# Patient Record
Sex: FEMALE | Race: WHITE | NOT HISPANIC OR LATINO | URBAN - METROPOLITAN AREA
[De-identification: names, ages, dates, MRNs, and addresses within clinical notes are randomized per-mention and may not be internally consistent; named-entity substitution may affect disease eponyms.]

---

## 2018-06-15 ENCOUNTER — LAB REQUISITION (OUTPATIENT)
Dept: LAB | Facility: HOSPITAL | Age: 63
End: 2018-06-15
Payer: COMMERCIAL

## 2018-06-15 DIAGNOSIS — Z12.72 ENCOUNTER FOR SCREENING FOR MALIGNANT NEOPLASM OF VAGINA: ICD-10-CM

## 2018-06-15 DIAGNOSIS — Z01.419 ENCOUNTER FOR GYNECOLOGICAL EXAMINATION WITHOUT ABNORMAL FINDING: ICD-10-CM

## 2018-06-15 DIAGNOSIS — Z11.51 ENCOUNTER FOR SCREENING FOR HUMAN PAPILLOMAVIRUS (HPV): ICD-10-CM

## 2018-06-15 PROCEDURE — 87624 HPV HI-RISK TYP POOLED RSLT: CPT | Performed by: OBSTETRICS & GYNECOLOGY

## 2018-06-15 PROCEDURE — G0145 SCR C/V CYTO,THINLAYER,RESCR: HCPCS | Performed by: OBSTETRICS & GYNECOLOGY

## 2018-06-19 LAB
HPV RRNA GENITAL QL NAA+PROBE: NORMAL
LAB AP GYN PRIMARY INTERPRETATION: NORMAL
Lab: NORMAL

## 2019-08-09 ENCOUNTER — TELEPHONE (OUTPATIENT)
Dept: GYNECOLOGY | Facility: CLINIC | Age: 64
End: 2019-08-09

## 2019-08-09 DIAGNOSIS — N95.2 POSTMENOPAUSAL ATROPHIC VAGINITIS: Primary | ICD-10-CM

## 2019-08-12 RX ORDER — ESTRADIOL 10 UG/1
1 INSERT VAGINAL 2 TIMES WEEKLY
Qty: 24 TABLET | Refills: 4 | Status: SHIPPED | OUTPATIENT
Start: 2019-08-12 | End: 2019-08-16 | Stop reason: SDUPTHER

## 2019-08-16 ENCOUNTER — ANNUAL EXAM (OUTPATIENT)
Dept: GYNECOLOGY | Facility: CLINIC | Age: 64
End: 2019-08-16
Payer: COMMERCIAL

## 2019-08-16 VITALS
HEIGHT: 65 IN | HEART RATE: 61 BPM | TEMPERATURE: 97.5 F | WEIGHT: 202.6 LBS | DIASTOLIC BLOOD PRESSURE: 74 MMHG | BODY MASS INDEX: 33.76 KG/M2 | SYSTOLIC BLOOD PRESSURE: 120 MMHG | RESPIRATION RATE: 17 BRPM

## 2019-08-16 DIAGNOSIS — N39.3 SUI (STRESS URINARY INCONTINENCE, FEMALE): ICD-10-CM

## 2019-08-16 DIAGNOSIS — Z85.42 PERSONAL HISTORY OF MALIGNANT NEOPLASM OF UTERUS: ICD-10-CM

## 2019-08-16 DIAGNOSIS — Z91.89 GYN EXAM FOR HIGH-RISK MEDICARE PATIENT: Primary | ICD-10-CM

## 2019-08-16 DIAGNOSIS — N95.2 POSTMENOPAUSAL ATROPHIC VAGINITIS: ICD-10-CM

## 2019-08-16 DIAGNOSIS — Z11.51 SCREENING FOR HUMAN PAPILLOMAVIRUS (HPV): ICD-10-CM

## 2019-08-16 DIAGNOSIS — Z12.39 BREAST CANCER SCREENING: ICD-10-CM

## 2019-08-16 PROCEDURE — 87624 HPV HI-RISK TYP POOLED RSLT: CPT | Performed by: OBSTETRICS & GYNECOLOGY

## 2019-08-16 PROCEDURE — G0145 SCR C/V CYTO,THINLAYER,RESCR: HCPCS | Performed by: OBSTETRICS & GYNECOLOGY

## 2019-08-16 PROCEDURE — G0101 CA SCREEN;PELVIC/BREAST EXAM: HCPCS | Performed by: OBSTETRICS & GYNECOLOGY

## 2019-08-16 RX ORDER — DIPHENOXYLATE HYDROCHLORIDE AND ATROPINE SULFATE 2.5; .025 MG/1; MG/1
1 TABLET ORAL DAILY
COMMUNITY

## 2019-08-16 RX ORDER — ESTRADIOL 10 UG/1
1 INSERT VAGINAL 2 TIMES WEEKLY
Qty: 24 TABLET | Refills: 4 | Status: SHIPPED | OUTPATIENT
Start: 2019-08-19

## 2019-08-16 RX ORDER — GABAPENTIN 300 MG/1
300 CAPSULE ORAL 3 TIMES DAILY
COMMUNITY

## 2019-08-16 RX ORDER — IBUPROFEN 200 MG
1 CAPSULE ORAL DAILY
COMMUNITY

## 2019-08-19 NOTE — PROGRESS NOTES
Assessment/Plan:       Diagnoses and all orders for this visit:    GYN exam for high-risk Medicare patient  -     Liquid-based pap, screening    Personal history of malignant neoplasm of uterus    KERRIE (stress urinary incontinence, female)    Postmenopausal atrophic vaginitis  -     estradiol (VAGIFEM, YUVAFEM) 10 MCG TABS vaginal tablet; Insert 1 tablet (10 mcg total) into the vagina 2 (two) times a week    Breast cancer screening  -     Mammo screening bilateral w cad; Future    Screening for human papillomavirus (HPV)  -     Liquid-based pap, screening    Other orders  -     multivitamin (THERAGRAN) TABS; Take 1 tablet by mouth daily  -     gabapentin (NEURONTIN) 300 mg capsule; Take 300 mg by mouth 3 (three) times a day  -     calcium citrate (CALCITRATE) 950 MG tablet; Take 1 tablet by mouth daily          Subjective:      Patient ID: Kehinde Orosco is a 59 y o  female  The patient is a 70-year-old with a history of endometrial cancer in 2003  She has had a hysterectomy with BSO  The patient presents for an annual exam     She denies any vaginal bleeding or breast problems  The patient continues to have some stress urinary incontinence but it has not worsened since last year  She occasionally wears a pad but is not interested in any treatment at this time  The patient does have some vaginal dryness and uses vaginal estrogen  She would like to continue  The patient has not had a Pap smear in a couple of years and will have 1 done this year  The following portions of the patient's history were reviewed and updated as appropriate: allergies, current medications, past family history, past medical history, past social history, past surgical history and problem list     Review of Systems   Constitutional: Negative  Respiratory: Negative for shortness of breath  Cardiovascular: Negative for chest pain  Gastrointestinal: Negative  Genitourinary: Negative  Skin: Negative  Psychiatric/Behavioral: Negative for agitation, behavioral problems and confusion  Objective:      /74 (BP Location: Left arm, Patient Position: Sitting, Cuff Size: Standard)   Pulse 61   Temp 97 5 °F (36 4 °C) (Tympanic)   Resp 17   Ht 5' 5" (1 651 m)   Wt 91 9 kg (202 lb 9 6 oz)   BMI 33 71 kg/m²          Physical Exam   Constitutional: She appears well-developed and well-nourished  No distress  HENT:   Head: Normocephalic  Pulmonary/Chest: Effort normal  No respiratory distress  Right breast exhibits no inverted nipple, no mass, no nipple discharge, no skin change and no tenderness  Left breast exhibits no inverted nipple, no mass, no nipple discharge, no skin change and no tenderness  Breasts are symmetrical    Abdominal: She exhibits no distension and no mass  There is no tenderness  There is no rebound and no guarding  Genitourinary: Rectum normal  No labial fusion  There is no rash, tenderness, lesion or injury on the right labia  There is no rash, tenderness, lesion or injury on the left labia  No erythema, tenderness or bleeding in the vagina  No foreign body in the vagina  No signs of injury around the vagina  No vaginal discharge found  Genitourinary Comments: The urethral meatus is normal    Atrophic vaginal changes are present  The cervix, uterus and adnexa are surgically absent  Lymphadenopathy:        Right axillary: No pectoral and no lateral adenopathy present  Left axillary: No pectoral and no lateral adenopathy present  Skin: Skin is warm, dry and intact  She is not diaphoretic  No cyanosis  Nails show no clubbing  Psychiatric: She has a normal mood and affect   Her speech is normal and behavior is normal

## 2019-08-21 LAB
HPV HR 12 DNA CVX QL NAA+PROBE: NEGATIVE
HPV16 DNA CVX QL NAA+PROBE: NEGATIVE
HPV18 DNA CVX QL NAA+PROBE: NEGATIVE

## 2019-08-22 LAB
LAB AP GYN PRIMARY INTERPRETATION: NORMAL
Lab: NORMAL

## 2019-08-27 ENCOUNTER — TELEPHONE (OUTPATIENT)
Dept: GYNECOLOGY | Facility: CLINIC | Age: 64
End: 2019-08-27

## 2019-08-27 NOTE — TELEPHONE ENCOUNTER
----- Message from Meg Merlin, MD sent at 8/22/2019  3:28 PM EDT -----  Notify pt that her pap and HPV are normal

## 2020-10-29 ENCOUNTER — TELEPHONE (OUTPATIENT)
Dept: GYNECOLOGY | Facility: CLINIC | Age: 65
End: 2020-10-29

## 2021-04-08 DIAGNOSIS — Z23 ENCOUNTER FOR IMMUNIZATION: ICD-10-CM

## 2022-03-22 ENCOUNTER — OFFICE VISIT (OUTPATIENT)
Dept: GASTROENTEROLOGY | Facility: CLINIC | Age: 67
End: 2022-03-22
Payer: COMMERCIAL

## 2022-03-22 VITALS
WEIGHT: 224 LBS | HEIGHT: 66 IN | HEART RATE: 77 BPM | DIASTOLIC BLOOD PRESSURE: 90 MMHG | SYSTOLIC BLOOD PRESSURE: 131 MMHG | BODY MASS INDEX: 36 KG/M2 | TEMPERATURE: 99.1 F

## 2022-03-22 DIAGNOSIS — R19.4 CHANGE IN BOWEL HABITS: Primary | ICD-10-CM

## 2022-03-22 PROCEDURE — 99204 OFFICE O/P NEW MOD 45 MIN: CPT | Performed by: INTERNAL MEDICINE

## 2022-03-22 RX ORDER — POLYETHYLENE GLYCOL 3350 17 G/17G
POWDER, FOR SOLUTION ORAL
Qty: 238 G | Refills: 0 | Status: SHIPPED | OUTPATIENT
Start: 2022-03-22

## 2022-03-22 NOTE — PROGRESS NOTES
[facility] - Outpatient Consultation  Luz Saldivar 77 y o  female MRN: [id number]  Encounter: [id number]          ASSESSMENT AND PLAN:      1  Change in bowel habits  She is starting go to pelvic floor PT (4 pregnancies in the past)  Decrease coffee intake (drinks 4-5+ large cups a day currently)  Increase water intake with fiber (benefiber powder and vegetables)  Colonoscopy to assess for microscopic colitis    - Colonoscopy; Future  - polyethylene glycol (GLYCOLAX) 17 GM/SCOOP powder; At 5pm take 5mgx2 dulcolax  At 6pm 32oz miralax in 64oz gatorade  Drink 8oz glass every 5 mins until 32oz finished  Drink remaining as rec  Dispense: 238 g; Refill: 0          The rationale for endoscopy with possible biopsy, possible polypectomy, with IV sedation as well as all risks, benefits, and alternatives were discussed with the patient in detail  Risks including but not limited to perforation, bleeding, need for blood transfusion or emergent surgery, and missed neoplasm were reviewed in detail with the patient  The patient demonstrates full understanding and wishes to proceed  ______________________________________________________________    HPI:  Luz Saldivar is a 77y o  year old female who presents to the office for evaluation of diarrhea  She is typcally having diarhrea throughout the day  She will have a single bowel movement in the morning  Then she will have several bowel movements throughout the day and she has urgency to go the bathroom  She has had several accidents in the past   She will have 2-3 accidents a week  She has been travelling and 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 house togo to the bathroom and she needs to use an outside bathroom she ends up going  She is having one normal BM in the morning and then 4-5x after that  She will sometimes have to go two times within a short period   She can have pine cone bowel movements and they can hurt her   She will have gas and small incontinence of stool  Stool studies have been done but these were all negative  She did have a colonoscopy 2 years ago, diarrhea was occasional at the time and was controlled with imodium, kaopectate and pepto bismol  To start her day she takes imodium or kaopectate prior to doing anything now  She does recall that 10-15 years ago she was workin gfor a tough job  She does have history of 4 pregnancies, all vaginal deliveries including 10 lbs, 2 around 9 pounds and 7 lbs  REVIEW OF SYSTEMS:    CONSTITUTIONAL: Denies any fever, chills, rigors, and weight loss  HEENT: No earache or tinnitus  Denies hearing loss or visual disturbances  CARDIOVASCULAR: No chest pain or palpitations  RESPIRATORY: Denies any cough, hemoptysis, shortness of breath or dyspnea on exertion  GASTROINTESTINAL: As noted in the History of Present Illness  GENITOURINARY: No problems with urination  Denies any hematuria or dysuria  NEUROLOGIC: No dizziness or vertigo, denies headaches  MUSCULOSKELETAL: Denies any muscle or joint pain  SKIN: Denies skin rashes or itching  ENDOCRINE: Denies excessive thirst  Denies intolerance to heat or cold  PSYCHOSOCIAL: Denies depression or anxiety  Denies any recent memory loss  Historical Information   Past Medical History:   Diagnosis Date    Arthritis     Endometrial cancer ([facility] [address] ) 2003     Past Surgical History:   Procedure Laterality Date    TOTAL ABDOMINAL HYSTERECTOMY W/ BILATERAL SALPINGOOPHORECTOMY       Social History   Social History     Substance and Sexual Activity   Alcohol Use Not Currently     Social History     Substance and Sexual Activity   Drug Use Never     Social History     Tobacco Use   Smoking Status Never Smoker   Smokeless Tobacco Never Used     History reviewed  No pertinent family history  Meds/Allergies     No current outpatient medications on file      Allergies   Allergen Reactions    Latex Abdominal Pain    Pollen Extract Dizziness           Objective     Blood pressure 131/90, pulse 77, temperature 99 1 °F (37 3 °C), temperature source Tympanic, height 5' 6" (1 676 m), weight 102 kg (224 lb)  Body mass index is 36 15 kg/m²  PHYSICAL EXAM:      General Appearance:   Alert, cooperative, no distress   HEENT:   Normocephalic, atraumatic, anicteric  Lungs:   Equal chest rise and unlabored breathing, normal cough   Heart:   No visualized JVD   Abdomen:   Soft, non-tender, non-distended; no masses, no organomegaly    Genitalia:   Deferred    Rectal:   Deferred    Extremities:  No cyanosis, clubbing or edema    Pulses:  Musculoskeletal:  2+ and symmetric  Normal range of motion visualized    Skin:  Neuro:  No jaundice, rashes, or lesions   Alert and appropriate       Lab Results:   No visits with results within 1 Day(s) from this visit  Latest known visit with results is:   Lab Requisition on 06/15/2018   Component Date Value    Case Report 06/15/2018                      Value:Gynecologic Cytology Report                       Case: [id number]                                  Authorizing Provider:  Mikaela Casey MD          Collected:           06/15/2018                   First Screen:          Melvenia Needs, CT  Received:            06/18/2018 8068              Specimen:    LIQUID-BASED PAP, SCREENING, Vaginal                                                       Primary Interpretation 06/15/2018 Negative for intraepithelial lesion or malignancy     Specimen Adequacy 06/15/2018 Satisfactory for evaluation   High Risk HPV Result 06/15/2018                      Value:HPV, High Risk: HPV NEG, HPV16 NEG, HPV18 NEG      Other High Risk HPV Negative, HPV 16 Negative, HPV 18 Negative  HPV types: 16,18,31,33,35,39,45,51,52,56,58,59,66 and 68 DNA are undetectable or below the pre-set threshold    Roches FDA approved Radha 4800 is utilized with strict adherence to the [employer] instruction  manual to test for the presence of High-Risk HPV DNA, as well as HPV 16 and HPV 18  This instrument  has been validated by our laboratory and/or by the   A negative result does not preclude the presence of HPV infection because results depend on adequate  specimen collection, absence of inhibitors and sufficient DNA to be detected  Additionally, HPV negative  results are not intended to prevent women from proceeding to colposcopy if clinically warranted  Positive HPV test results indicate the presence of any one or more of the high risk types, but since patients  are often co-infected with low-risk types it does not rule out the presence of low-risk                           types in patients  with mixed infections   Additional Information 06/15/2018                      Value: This result contains rich text formatting which cannot be displayed here   LMP 06/15/2018      HPV, High Risk 06/15/2018 HPV NEG, HPV16 NEG, HPV18 NEG          Radiology Results:   No results found

## 2022-03-22 NOTE — PATIENT INSTRUCTIONS
Start pelvic floor PT and Kegel exercises  Decrease coffee intake (drinks 4-5+ large cups a day currently)  Increase water intake with fiber (benefiber powder and vegetables)    Schedule colonoscopy to assess for microscopic colitis      Scheduled date of colonoscopy (as of today): 4/29/22  Physician performing colonoscopy: Dr Lindsay Noe  [place] [facility] [place]   Bowel prep reviewed with patient: miralax/dulcolax  Instructions reviewed with patient by: Zayda Gallagher  Clearances:  n/a

## 2022-03-25 ENCOUNTER — TELEPHONE (OUTPATIENT)
Dept: GASTROENTEROLOGY | Facility: CLINIC | Age: 67
End: 2022-03-25

## 2022-03-25 NOTE — TELEPHONE ENCOUNTER
Patients GI provider:  Dr Stormy Perez    Number to return call: [phone]    Reason for call: Pt calling to see if Dr Stormy Perez has an availability to perform her procedure  Above is her number       Scheduled procedure/appointment date if applicable: procedure 6/39/98

## 2022-03-28 NOTE — TELEPHONE ENCOUNTER
Spoke with patient and will keep scheduled colonoscopy with Dr Chana Rucker on 4/29/22  Dr Danny Dye does not have anything until late Alysia

## 2022-04-19 ENCOUNTER — TRANSCRIBE ORDERS (OUTPATIENT)
Dept: GASTROENTEROLOGY | Facility: CLINIC | Age: 67
End: 2022-04-19

## 2022-04-29 ENCOUNTER — ANESTHESIA (OUTPATIENT)
Dept: GASTROENTEROLOGY | Facility: HOSPITAL | Age: 67
End: 2022-04-29

## 2022-04-29 ENCOUNTER — ANESTHESIA EVENT (OUTPATIENT)
Dept: GASTROENTEROLOGY | Facility: HOSPITAL | Age: 67
End: 2022-04-29

## 2022-04-29 ENCOUNTER — HOSPITAL ENCOUNTER (OUTPATIENT)
Dept: GASTROENTEROLOGY | Facility: HOSPITAL | Age: 67
Setting detail: OUTPATIENT SURGERY
Discharge: HOME | End: 2022-04-29
Attending: INTERNAL MEDICINE | Admitting: INTERNAL MEDICINE
Payer: COMMERCIAL

## 2022-04-29 VITALS
OXYGEN SATURATION: 100 % | SYSTOLIC BLOOD PRESSURE: 152 MMHG | RESPIRATION RATE: 16 BRPM | TEMPERATURE: 96.4 F | HEART RATE: 51 BPM | DIASTOLIC BLOOD PRESSURE: 86 MMHG

## 2022-04-29 DIAGNOSIS — R19.7 DIARRHEA, UNSPECIFIED TYPE: ICD-10-CM

## 2022-04-29 DIAGNOSIS — R19.4 CHANGE IN BOWEL HABITS: ICD-10-CM

## 2022-04-29 PROBLEM — S06.9X9A TBI (TRAUMATIC BRAIN INJURY) (HCC): Status: ACTIVE | Noted: 2022-04-29

## 2022-04-29 PROBLEM — C44.90 SKIN CANCER: Status: ACTIVE | Noted: 2020-02-06

## 2022-04-29 PROBLEM — R42 VERTIGO: Status: ACTIVE | Noted: 2020-08-27

## 2022-04-29 PROCEDURE — 88305 TISSUE EXAM BY PATHOLOGIST: CPT | Performed by: PATHOLOGY

## 2022-04-29 PROCEDURE — 45380 COLONOSCOPY AND BIOPSY: CPT | Performed by: INTERNAL MEDICINE

## 2022-04-29 RX ORDER — SODIUM CHLORIDE 9 MG/ML
INJECTION, SOLUTION INTRAVENOUS CONTINUOUS PRN
Status: DISCONTINUED | OUTPATIENT
Start: 2022-04-29 | End: 2022-04-29

## 2022-04-29 RX ORDER — ATORVASTATIN CALCIUM 10 MG/1
TABLET, FILM COATED ORAL
COMMUNITY
Start: 2022-04-12

## 2022-04-29 RX ORDER — PROPOFOL 10 MG/ML
INJECTION, EMULSION INTRAVENOUS CONTINUOUS PRN
Status: DISCONTINUED | OUTPATIENT
Start: 2022-04-29 | End: 2022-04-29

## 2022-04-29 RX ORDER — PROPOFOL 10 MG/ML
INJECTION, EMULSION INTRAVENOUS AS NEEDED
Status: DISCONTINUED | OUTPATIENT
Start: 2022-04-29 | End: 2022-04-29

## 2022-04-29 RX ADMIN — PROPOFOL 30 MG: 10 INJECTION, EMULSION INTRAVENOUS at 08:13

## 2022-04-29 RX ADMIN — PROPOFOL 80 MCG/KG/MIN: 10 INJECTION, EMULSION INTRAVENOUS at 08:15

## 2022-04-29 RX ADMIN — PROPOFOL 40 MG: 10 INJECTION, EMULSION INTRAVENOUS at 08:24

## 2022-04-29 RX ADMIN — PROPOFOL 50 MG: 10 INJECTION, EMULSION INTRAVENOUS at 08:12

## 2022-04-29 RX ADMIN — SODIUM CHLORIDE: 9 INJECTION, SOLUTION INTRAVENOUS at 08:09

## 2022-04-29 NOTE — ANESTHESIA PREPROCEDURE EVALUATION
Procedure:  COLONOSCOPY    Relevant Problems   GYN   (+) Malignant neoplasm of uterus (HCC)      Other   (+) Arthralgia of hip   (+) Diarrhea   (+) Skin cancer   (+) TBI (traumatic brain injury) ([facility] [address] )   (+) Vertigo        Physical Exam    Airway    Mallampati score: II  TM Distance: >3 FB  Neck ROM: full     Dental   No notable dental hx     Cardiovascular  Cardiovascular exam normal    Pulmonary  Pulmonary exam normal     Other Findings        Anesthesia Plan  ASA Score- 2     Anesthesia Type- IV sedation with anesthesia with ASA Monitors  Additional Monitors:   Airway Plan:           Plan Factors-    Chart reviewed  Existing labs reviewed  Patient summary reviewed  Induction- intravenous  Postoperative Plan-     Informed Consent- Anesthetic plan and risks discussed with patient  I personally reviewed this patient with the CRNA  Discussed and agreed on the Anesthesia Plan with the CRNA  Kylie Deal

## 2022-04-29 NOTE — ANESTHESIA POSTPROCEDURE EVALUATION
Post-Op Assessment Note    CV Status:  Stable  Pain Score: 0    Pain management: adequate     Mental Status:  Alert and awake   Hydration Status:  Euvolemic and stable   PONV Controlled:  Controlled   Airway Patency:  Patent and adequate      Post Op Vitals Reviewed: Yes      Staff: CRNA         No complications documented      BP  115/78    Temp      Pulse 57   Resp 16   SpO2 98%

## 2022-04-29 NOTE — H&P
History and Physical -  [facility]  Lisa Morgan 77 y o  female MRN: [id number]                  HPI: Lisa Morgan is a 77y o  year old female who presents for diarrhea      REVIEW OF SYSTEMS: Per the HPI, and otherwise unremarkable  Historical Information   Past Medical History:   Diagnosis Date    Arthritis     Endometrial cancer ([facility] [address] ) 2003     Past Surgical History:   Procedure Laterality Date    JOINT REPLACEMENT Bilateral     2019, 2009    TOTAL ABDOMINAL HYSTERECTOMY W/ BILATERAL SALPINGOOPHORECTOMY Bilateral 2003    HYSTERECTOMY, BSO, STAGING    TOTAL ABDOMINAL HYSTERECTOMY W/ BILATERAL SALPINGOOPHORECTOMY       Social History   Social History     Substance and Sexual Activity   Alcohol Use Not Currently    Comment: ocasionally     Social History     Substance and Sexual Activity   Drug Use Never     Social History     Tobacco Use   Smoking Status Never Smoker   Smokeless Tobacco Never Used   Tobacco Comment    NO TOBACCO USE     No family history on file  Meds/Allergies       Current Outpatient Medications:     atorvastatin (LIPITOR) 10 mg tablet    calcium citrate (CALCITRATE) 950 MG tablet    estradiol (VAGIFEM, YUVAFEM) 10 MCG TABS vaginal tablet    gabapentin (NEURONTIN) 300 mg capsule    multivitamin (THERAGRAN) TABS    polyethylene glycol (GLYCOLAX) 17 GM/SCOOP powder    Allergies   Allergen Reactions    Latex Abdominal Pain       Objective     /81   Pulse 68   Temp 97 7 °F (36 5 °C) (Tympanic)   Resp 18   SpO2 95%       PHYSICAL EXAM    Gen: NAD  Head: NCAT  CV: RRR  CHEST: Clear  ABD: soft, NT/ND  EXT: no edema      ASSESSMENT/PLAN:  This is a 77y o  year old female here for colonoscopy, and she is stable and optimized for her procedure

## 2022-05-16 ENCOUNTER — OFFICE VISIT (OUTPATIENT)
Dept: GASTROENTEROLOGY | Facility: CLINIC | Age: 67
End: 2022-05-16
Payer: COMMERCIAL

## 2022-05-16 VITALS
TEMPERATURE: 97.1 F | BODY MASS INDEX: 35.03 KG/M2 | WEIGHT: 218 LBS | DIASTOLIC BLOOD PRESSURE: 70 MMHG | SYSTOLIC BLOOD PRESSURE: 124 MMHG | HEIGHT: 66 IN

## 2022-05-16 DIAGNOSIS — R19.7 DIARRHEA, UNSPECIFIED TYPE: ICD-10-CM

## 2022-05-16 DIAGNOSIS — R19.4 CHANGE IN BOWEL HABITS: Primary | ICD-10-CM

## 2022-05-16 PROCEDURE — 99213 OFFICE O/P EST LOW 20 MIN: CPT | Performed by: INTERNAL MEDICINE

## 2022-05-16 RX ORDER — FLUTICASONE PROPIONATE 50 MCG
2 SPRAY, SUSPENSION (ML) NASAL DAILY
COMMUNITY
Start: 2022-01-13

## 2022-05-16 NOTE — PROGRESS NOTES
[facility] - Outpatient Follow-up Note  Pratibha Dai 77 y o  female MRN: [id number]  Encounter: [id number]          ASSESSMENT AND PLAN:      1  Change in bowel habits    2  Diarrhea, unspecified type    Discussed improving her diet including a high-fiber diet and using stool  His if necessary  Patient also is having incontinence and told her that performing Kegel exercises are paramount in improving her underlying issue  He has had 4 vaginal pregnancies all with large kids which affected her pelvic floor  Patient to follow up on as-needed basis  ______________________________________________________________________    SUBJECTIVE:  Diarrhea still present  She is having more water and more fiber in her diet  She has been doing pelvic floor exercises and she has noted significant improvement  She had colonoscopy and she was noted to have diverticulosis, random biopsies were negative for microscopic colitis  He has stopped coffee and has noted significant improvement  She also has been doing her Kegel exercises which have helped immensely  She is very appreciative of the support that she has received  He has noticed significant improvement with these exercises  She does follow with pelvic floor PT  Patient will be moving down [place] [address] to [facility] so probably will not be able to follow up  She is concerned that she will need some type of assistance when needing to the bathroom as public restroom is a much more difficult to get to  REVIEW OF SYSTEMS IS OTHERWISE NEGATIVE        Historical Information   Past Medical History:   Diagnosis Date    Arthritis     Endometrial cancer ([facility] [address] ) 2003     Past Surgical History:   Procedure Laterality Date    JOINT REPLACEMENT Bilateral     2019, 2009    TOTAL ABDOMINAL HYSTERECTOMY W/ BILATERAL SALPINGOOPHORECTOMY Bilateral 2003    HYSTERECTOMY, BSO, STAGING    TOTAL ABDOMINAL HYSTERECTOMY W/ BILATERAL SALPINGOOPHORECTOMY Social History   Social History     Substance and Sexual Activity   Alcohol Use Not Currently    Comment: ocasionally     Social History     Substance and Sexual Activity   Drug Use Never     Social History     Tobacco Use   Smoking Status Never Smoker   Smokeless Tobacco Never Used   Tobacco Comment    NO TOBACCO USE     History reviewed  No pertinent family history  Meds/Allergies       Current Outpatient Medications:     atorvastatin (LIPITOR) 10 mg tablet    calcium citrate (CALCITRATE) 950 MG tablet    Diclofenac Sodium (VOLTAREN) 1 %    estradiol (VAGIFEM, YUVAFEM) 10 MCG TABS vaginal tablet    fluticasone (FLONASE) 50 mcg/act nasal spray    gabapentin (NEURONTIN) 300 mg capsule    multivitamin (THERAGRAN) TABS    polyethylene glycol (GLYCOLAX) 17 GM/SCOOP powder    Allergies   Allergen Reactions    Latex Abdominal Pain           Objective     Blood pressure 124/70, temperature (!) 97 1 °F (36 2 °C), temperature source Tympanic, height 5' 6" (1 676 m), weight 98 9 kg (218 lb)  Body mass index is 35 19 kg/m²  PHYSICAL EXAM:      General Appearance:   Alert, cooperative, no distress   HEENT:   Normocephalic, atraumatic, anicteric  Lungs:   Equal chest rise and unlabored breathing, normal cough   Heart:   No visualized JVD   Abdomen:   Soft, non-tender, non-distended; no masses, no organomegaly    Genitalia:   Deferred    Rectal:   Deferred    Extremities:  No cyanosis, clubbing or edema    Pulses:  Musculoskeletal:  2+ and symmetric  Normal range of motion visualized    Skin:  Neuro:  No jaundice, rashes, or lesions   Alert and appropriate           Lab Results:   No visits with results within 1 Day(s) from this visit     Latest known visit with results is:   Hospital Outpatient Visit on 04/29/2022   Component Date Value    Case Report 04/29/2022                      Value:Surgical Pathology Report                         Case: [id number]                                   Authorizing Provider:  Tye Caldwell MD        Collected:           04/29/2022 0830              Ordering Location:     [address] [place] [address]      Received:            04/29/2022 [id number] [facility] Endoscopy                                                           Pathologist:           Joni Chavis MD                                                       Specimen:    Colon, random colon for microscopic colitis                                                Final Diagnosis 04/29/2022                      Value: This result contains rich text formatting which cannot be displayed here   Note 04/29/2022                      Value: This result contains rich text formatting which cannot be displayed here   Additional Information 04/29/2022                      Value: This result contains rich text formatting which cannot be displayed here  Mauro Guzman Gross Description 04/29/2022                      Value: This result contains rich text formatting which cannot be displayed here  Radiology Results:   Colonoscopy    Result Date: 4/29/2022  Narrative: [address] [place] [address] Endoscopy [id number] [place] [id number] [address]: 4/29/22 PHYSICIAN(S): Attending: Jen Logan MD Fellow: Tye Caldwell MD INDICATION: Change in bowel habits, Diarrhea, unspecified type POST-OP DIAGNOSIS: See the impression below  HISTORY: Prior colonoscopy: Less than 3 years ago  It is being repeated at an interval of less than 3 years because: This colonoscopy is being performed for a diagnostic indication BOWEL PREPARATION: Miralax/Dulcolax PREPROCEDURE: Informed consent was obtained for the procedure, including sedation  Risks including but not limited to bleeding, infection, perforation, adverse drug reaction and aspiration were explained in detail  Also explained about less than 100% sensitivity with the exam and other alternatives   The patient was placed in the left lateral decubitus position  DETAILS OF PROCEDURE: Patient was taken to the procedure room where a time out was performed to confirm correct patient and correct procedure  The patient underwent monitored anesthesia care, which was administered by an anesthesia professional  The patient's blood pressure, heart rate, level of consciousness, oxygen, respirations and ETCO2 were monitored throughout the procedure  A digital rectal exam was performed  A perianal exam was performed  The scope was introduced through the anus and advanced to the cecum  Retroflexion was performed in the rectum  The quality of bowel preparation was evaluated using the [facility] Bowel Preparation Scale with scores of: right colon = 1, transverse colon = 2, left colon = 2  The total BBPS score was 5  Bowel prep was not adequate  The patient experienced no blood loss  The procedure was not difficult  The patient tolerated the procedure well  There were no apparent complications  ANESTHESIA INFORMATION: ASA: II Anesthesia Type: Anesthesia type not filed in the log   MEDICATIONS: No administrations occurring from 0809 to 0845 on 04/29/22 FINDINGS: Few large, moderate scattered diverticula causing mild luminal narrowing Mild edematous mucosa in the ascending colon and hepatic flexure Performed biopsies to rule out colitis in the cecum, ascending colon, descending colon and sigmoid colon Small, internal hemorrhoids EVENTS: Procedure Events Event Event Time ENDO CECUM REACHED 4/29/2022  8:28 AM ENDO SCOPE OUT TIME 4/29/2022  8:43 AM SPECIMENS: ID Type Source Tests Collected by Time Destination 1 : random colon for microscopic colitis Tissue Colon TISSUE EXAM Wanda Russ MD 4/29/2022  8:30 AM  EQUIPMENT: Colonoscope -[id number]     Impression: Diverticulosis throughout the colon Mild edema in the right side of the colon Performed random biopsies to rule out microscopic colitis Small, internal hemorrhoids RECOMMENDATION: Await pathology results  Follow-up with  Rodney ATTENDING ATTESTATION:  I was present throughout the entire procedure from insertion to complete withdrawal of the scope  I performed all interventions myself or oversaw the fellow     Erika Paulson MD